# Patient Record
(demographics unavailable — no encounter records)

---

## 2017-01-26 NOTE — REP
Chest x-ray:  Two views:

 

History:  Hypertension.

 

Comparison chest x-ray: 10/08/2014.

 

Findings:  The lungs are hyperinflated but free of infiltrate.  Pleural angles

are sharp.  Heart is not enlarged.  The aorta is calcific and slightly tortuous.

There are degenerative changes in the thoracic spine.

 

Impression:

 

No active disease.

 

 

Signed by

Master Coffey MD 01/26/2017 04:53 P

## 2017-01-27 NOTE — ECGEPIP
Stationary ECG Study

                              [facility]

                                       

                                       Test Date:    2017

Pat Name:     LEWIS PALLADINO          Department:   

Patient ID:   [id number]                 Room:         -

Gender:       M                        Technician:   CHARLIE

:          1952               Requested By: Paul Kiser 

Order Number: [id number]     Reading MD:   Tripp Epps

                                 Measurements

Intervals                              Axis          

Rate:         68                       P:            65

AZ:           152                      QRS:          24

QRSD:         80                       T:            42

QT:           382                                    

QTc:          407                                    

                           Interpretive Statements

SINUS RHYTHM

ARTIFACT ON THE BASELINE IN THE LIMB LEADS

NO PRIOR TRACING

Electronically Signed On 2017 1:15:37 EST by Tripp Epps

## 2017-02-08 NOTE — RO
DATE OF PROCEDURE:  02/08/2017

 

PREOPERATIVE DIAGNOSES:  Left carpal tunnel syndrome.  Left ulnar nerve

entrapment at the wrist.  Left ulnar nerve entrapment at the elbow.

 

POSTOPERATIVE DIAGNOSES:  Left carpal tunnel syndrome.  Left ulnar nerve

entrapment at the wrist.  Left ulnar nerve entrapment at the elbow.

 

PROCEDURE:  Left carpal tunnel release.  Left ulnar nerve decompression at the

wrist.  Left ulnar nerve transposition at the elbow.

 

SURGEON:  Dr. Paul Kiser

 

FIRST ASSISTANT:  Luis Miguel Rivers.

 

ANESTHESIA:  General.

 

ESTIMATED BLOOD LOSS:  Less than 10.

 

COMPLICATIONS:  None.

 

INDICATIONS:  This is a 64-year-old gentleman with multiple medical problems who

has had ongoing numbness in his hand.  EMG nerve conduction study was consistent

with carpal tunnel syndrome and ulnar nerve entrapment at the wrist and at the

elbow.  He had failed conservative management and wished to go ahead with

surgical treatment.  He understood the nature of the procedure and the risks of

bleeding, infection, damage to nerves, vessels, persistent pain, persistent

numbness, need for further surgery, recurrence, among others.

 

PROCEDURE:  The patient was taken to the operating room and placed in supine

position after general anesthesia was induced.  The left upper extremity was

prepped and draped in the usual sterile fashion.  A time-out was performed.  I

then began at the elbow, making a curvilinear incision along the medial aspect of

the elbow and bluntly dissected down through subcutaneous tissue until I

identified the ulnar nerve without too much difficulty.  This was unroofed and

freed up throughout the cubital tunnel and then I dissected around the nerve.  I

controlled hemostasis with the bipolar cautery and gradually dissected the nerve

out of its groove.  I created a flap anteriorly on the fascia and I did divide a

little bit of the fascia just to take some of the pressure off of the nerve.  I

gradually transposed the nerve anteriorly.  I did take out some of the inner

muscular septum proximally and took care not to divide any of the muscular

branches.  The nerve was easily transposed anteriorly.  I put the elbow through a

range of motion and then protected the nerve by retracting it anteriorly with a

Rangeley.  I irrigated.  I tacked down subcutaneous tissue to the medial epicondyle

with two #2-0 Vicryl sutures and put the elbow through a range of motion.  The

ulnar nerve was under no tension throughout flexion and extension.  Subcutaneous

was closed with #2-0 Vicryl, skin with staples.  Then I directed attention

distally.

 

Incision was made along the proximal ulnar palm.  This was curved ulnarly and

then along the ulnar aspect of the wrist over the ulnar nerve.  Blunt dissection

was carried down through subcutaneous tissue until the transverse carpal ligament

was encountered.  This was incised longitudinally with a 15 blade entering the

ulnar border of the carpal tunnel, which I completed the dissection proximally

and distally under direct visualization, protecting the median nerve with a

Rangeley, and palpating to make sure that the nerve was freed up.  The ulnar nerve

was then identified through the more ulnar aspect of this incision and blunt

dissection was carried down through subcutaneous tissue until the ulnar nerve was

identified.  I identified the neurovascular bundle proximally and worked my way

distally freeing it up.  It seemed to be a little bit tight around the

midportion.  Once I was satisfied with the release of this nerve and the release

of the median nerve, I irrigated copiously and closed the skin with #5-0 nylon

suture.  A sterile dressing was applied.  Tourniquet was deflated.  I placed the

arm in a long-arm posterior splint.  Sling was applied.  He was taken to recovery

room in stable condition.  There were no known complications.  The plan be

routine postoperative.  Will take the splint off in about 12 days and take the

stitches and staples out and start range of motion.

## 2018-12-13 NOTE — RO
DATE OF PROCEDURE:  12/13/2018

 

PREOPERATIVE DIAGNOSIS:  Right colon mass.

 

POSTOPERATIVE DIAGNOSIS:  Right colon mass.

 

PROCEDURE:  Robotic right hemicolectomy.

 

SURGEON:  Dr. Stanley Stock

 

ASSISTANT:  Dr. Rivera, assisted with retraction of the colon as well as

reanastomosis.

 

ANESTHESIA:  General.

 

ESTIMATED BLOOD LOSS:  50 mL.

 

COMPLICATIONS:  None.

 

INDICATIONS FOR PROCEDURE:  The patient is a 66-year-old male who presented to 
the ER with unexplained

weight loss.  After colonoscopy, he was found to

have a nearly obstructing mass, came back positive for tubular adenoma with

high-grade dysplasia.  However, his CEA levels are high and this mass is

circumferential, so recommendation was to proceed with colon resection.  Risks

and benefits of the procedure not limited to but including bleeding, infection,

hernia formation, damage to surrounding structures, anastomotic leak and need 
for

further surgery were discussed in detail with the patient.  Informed consent was

obtained and procedure was planned.

 

DESCRIPTION OF PROCEDURE:  The patient was brought back to operating room seven

after sufficient sedation, the abdomen was sterilely prepped and draped and a

Figueroa catheter was placed.  Next, a time-out was done to confirm proper patient,

proper procedure.  Following that, an 8 mm supraumbilical midline incision was

made and the Veress needle was used to gain access to the abdomen.  Once the

abdomen was entered and was filled to 15 mmHg, the Veress needle was then 
removed

and a 12 mm Optiview port was used to gain access.  Two 8 mm robotic ports were

then placed, one subxiphoid, one in the lower mid abdomen, and another 15 mm

robotic port was placed in the left upper quadrant.  The colon was examined.  
The

mass was able to be palpated with graspers near the hepatic flexure and then was

able to move the small bowel out of the way and then the robot was docked to the

ports.  After docking to the ports, the colon was grasped with a third arm,

mobilized medially.  The lateral peritoneal connections to the colon were taken

down using the vessel sealer and scissors.  Once this was completed, the colon

was mobilized around the hepatic flexure to the proximal transverse colon and 
the

colon continued to be mobilized inferiorly and medially until it was completely

dissected free and free from the duodenum.  Once this was all completed, the

terminal ileum was identified, elevated up in the air and the mesentery of the

terminal ileum was opened up and green load of the stapler was used to transect.

The mesentery was then dissected through the terminal ileum and into the cecal

mesentery until the ileocolic artery was identified.  It was then continued to 
be

mobilized proximally, the vessel was then skeletonized and then was able to be

clamped and ligated using the vessel sealer.  Once the rest of the mesentery was

freed up and the entire right colon was free, the robot was undocked, the

supraumbilical incision was widened to about 5 cm, and the Phillip wound 
protector

was placed.  The two ends of the colon were brought out, a side-to-side

anastomosis was created between the terminal ileum and the proximal transverse

colon using a FILI 75 stapler and a blue load.  Once this was completed, I over

sewed the corners with #3-0 silk sutures, placed some Tisseel over the

anastomosis, tucked it back inside the abdomen.  The fascia at the 
supraumbilical

site was then closed with a running #1 Vicryl suture.  Subcutaneous tissues were

reapproximated with #3-0 Vicryl sutures and then staples.  The left upper

quadrant 15 mm port site, fascia was then closed with a figure-of-eight #0 
Vicryl

suture.  The rest of the skin incisions were closed with #4-0 Vicryl 
subcuticular

sutures.  The abdomen was cleaned and dried.  Steri-Strips, 4x4, and tape were

applied, thus ending procedure.

AISHA

## 2018-12-14 NOTE — IPNPDOC
Subjective


Date Seen


The patient was seen on 12/14/18.





Subjective


Chief Complaint/HPI


RAPID ASSESSMENT


General:  Reports: Fatigue


Constitutional:  Reports: Weakness


Pulmonary:  Denies: Dyspnea, Cough


Cardiovascular:  Reports: Lt Headedness; 


   Denies: Chest Pain, Palpitations


Gastrointestinal:  Denies: Nausea, Vomiting


Neurological:  Reports: Weakness





Objective


Physical Examination


General Exam:  Positive: Alert, Cooperative, Moderate Distress


Chest Exam:  Positive: Diminished; 


   Negative: Rales, Rhonchi, Wheezing


Heart Exam:  Positive: Rate Normal, Normal S1, Normal S2; 


   Negative: Gallops, Murmurs


Abdomen Exam:  Positive: Normal bowel sounds, Soft; 


   Negative: Tenderness


Extremity Exam:  Negative: Edema


Neuro Exam:  Positive: Normal Speech


Other physical findings


amanda red blood per rectum





Assessment /Plan


Assessment


Rapid assessment called overnight for patient- he was on bedside commode and 

slipped off, found by nursing staff on the floor of his room with amanda red 

blood per rectum on the floor and on his legs-RAT was initiated. The patient 

upon arrival was awake and laying on the floor but with profound generalized 

weakness, he denied chest pain nor difficulty breathing nor abdomen pain. Found 

to be hypotensive and bradycardic as well. Stated when he was using the bathroom

commode he became very weak and could not hold himself up and therefore slid off

onto the floor, unsure if he hit his head on the way down, did not complain of 

any extremity pain. Bedside BS was checked and found to be in 170's, upon 

physical exam he did have amanda red blood per his rectum on his legs and on the 

floor around him. He had to be lifted by 3 nurses to be put back into bed, with 

extreme generalized weakness. Stat CBC and BMP was ordered. STAT CT head 

pending. Given 1 L fluid bolus with improvement in BP and HR. Will transfer him 

to ICU for closer monitoring, Dr Gosselin of general surgery was made aware of 

situation by Dr Eckert, agreed with plan of care by medicine team. If Hg should be 

less than 8, we will transfuse. Otherwise will maintain his BP with IVF, monitor

closely and let day team know of patients events overnight.





Plan/VTE


VTE Prophylaxis Ordered?:  Yes





VS, I&O, 24H, Fishbone


Vital Signs/I&O





Vital Signs








  Date Time  Temp Pulse Resp B/P (MAP) Pulse Ox O2 Delivery O2 Flow Rate FiO2


 


12/14/18 22:00 98.7 57 18 113/57 (75) 94 Room Air  


 


12/13/18 22:00       2.0 














I&O- Last 24 Hours up to 6 AM 


 


 12/14/18





 06:00


 


Intake Total 1980 ml


 


Output Total 550 ml


 


Balance 1430 ml











Laboratory Data


24H LABS


Laboratory Tests 2


12/14/18 07:38: Bedside Glucose (Misc Panel) 218H


12/14/18 11:37: Bedside Glucose (Misc Panel) 119H


12/14/18 16:33: Bedside Glucose (Misc Panel) 108


12/14/18 20:20: Bedside Glucose (Misc Panel) 123H


12/14/18 22:44: Bedside Glucose (Misc Panel) 178H


12/14/18 22:57: 


CBC/BMP


Laboratory Tests


12/14/18 22:57








Red Blood Count 2.91 L, Mean Corpuscular Volume 95.5, Mean Corpuscular 

Hemoglobin 30.9, Mean Corpuscular Hemoglobin Concent 32.4, Red Cell Distribution

Width 15.1 H, Neutrophils (%) (Auto) 75.4 H, Lymphocytes (%) (Auto) 15.1 L, 

Monocytes (%) (Auto) 7.8 H, Eosinophils (%) (Auto) 0.7, Basophils (%) (Auto) 

0.3, Neutrophils # (Auto) 12.2 H, Lymphocytes # (Auto) 2.4, Monocytes # (Auto) 

1.3 H, Eosinophils # (Auto) 0.1, Basophils # (Auto) 0.1





GME ATTESTATION


GME ATTESTATION


My faculty preceptor for this patient encounter was physically present during 

the encounter and was fully available. All aspects of the patient interview, 

examination, medical decision making process, and medical care plan development 

were reviewed and approved by the faculty preceptor. The faculty preceptor is 

aware and concurs with the plan as stated in the body of this note and will 

attest to such by his/her cosignature.











SAVAGE ACOSTA DO               Dec 14, 2018 23:11

## 2018-12-15 NOTE — REP
Emergency noncontrast brain CT:

 

History:  Altered mental status.

 

Findings:  Preliminary digital  radiograph is unremarkable.  Bone window

settings demonstrate an intact bony calvarium.  There is mucosal thickening in

the apex of the left maxillary sinus, within the left ethmoid air cells, and in

the sphenoid and left frontal sinus consistent with left-sided paranasal

sinusitis.  No intraorbital abnormality is seen.  Vascular calcification is

noted.

 

There is mild diffuse atrophy.  Gray-white differentiation pattern is normal

above and below the tentorium.  There are small vessel atherosclerotic changes in

the periventricular white matter.  There is no evidence of hemorrhage.  No

infarct, mass or midline shift is seen.

 

Impression:

 

Mild diffuse atrophy and vascular calcification.  Small vessel changes.  No acute

intracranial lesion.

 

 

Electronically Signed by

Master Coffey MD 12/15/2018 10:07 A

## 2018-12-15 NOTE — IPN
DATE OF SERVICE:  12/15/2018

 

Patient was seen last night after he had his GI bleeding issue and he had stopped

and had been stopped for about an hour after he had been transferred to the ICU.

His hematocrit was not significantly diminished at that time and he had no

further GI bleeding.  He was not tachycardiac and overall was not complaining of

any significant abnormalities.  No shortness of breath.  He had what sounds as a

vagal response in the bathroom with a bloody bowel movement and now presents for

additional evaluation/treatment monitoring here in the ICU.  Otherwise his

abdomen is soft, mildly tender at the incision but otherwise not significantly

tender at this point. I stopped his Toradol last night given he had an elevated

creatinine and with some BUN elevation as well.  His creatinine is coming down a

little bit today.  He has been afebrile.  His input and output show that he has

had some minimal urine output and I anticipate he has been relatively dry.  Part

of this might of been the bleeding that he had.  But fortunately given his

improvement of his BUN and he has been voiding, I anticipate this is probably

turning around slowly with our resuscitation fluid.  Otherwise on his abdominal

exam it is otherwise benign.  No evidence of erythema, no drainage.  No evidence

of infection.

 

IMPRESSION AND PLAN:

1.  GI bleeding seems to have stabilized.  No bleeding since that time last

night.  It is probably related to Plavix that was restarted on him and since will

discontinue that Plavix and unfortunately will take the higher risk of clotting

associated with not having Plavix on board, but with his recent bleed and the

potential need for reoperative intervention should he ever bleed again, I do feel

that that is a risk that we can take at this point.  From the standpoint of

overall he feels well, he is not having any complaints, he is hungry, I do feel

that we can advance his diet.  I would like to keep his IV fluids going at this

point and recheck his blood work this afternoon to make sure he does not have

ongoing bleeding issues.  If he has a decreasing hematocrit this afternoon, it

may warrant a couple units of blood helping his dehydration issue as well as

helping his bleeding (from the anastomosis, probable).

## 2018-12-16 NOTE — IPN
DATE:  12/16/2018

 

The patient overall states that he is doing relatively well.  Still had some

bloody bowel movements overnight and his hematocrit is down again this morning.

He do feel that he has some symptomatic orthostasis with this decreased

hematocrit, although it sounds as though it slowed down if not stopped.  I do

feel that with this being symptomatic he would benefit for some transfusions at

this point.  We have had a discussion about transfusions with the patient.  He

understands and would like to proceed with this.  He has been afebrile.  His

white count is back down to normal at this point.  His urine output has been

adequate from his description; however when ever he has a bowel movement, he has

some diarrhea.  He also urinates at the same time and is not "keeping this."  His

BUN has been relatively stable.  His creatinine continues to come down each day.

He has been tolerating a regular diet and has no nausea with this, has had no

other complaints from a gastrointestinal (GI) standpoint and his pain control has

been relatively good at this point with minimal by mouth pain meds.

 

On his physical exam, incisions healing nicely without any erythema, drainage or

discharge.  He has lungs which are clear to auscultation without crackles,

wheezes or rhonchi.

 

IMPRESSION AND PLAN:  The patient had a right colectomy.  He had some

postoperative bleeding most likely secondary to anticoagulation; and at this

point, I do feel that we need to keep him off this anticoagulation for now and I

would like to see how he is doing over the next 24 hours given that he does have

some orthostatic hypotension.  Orthostatic symptoms, specifically becoming dizzy

when he stands up, etc., I do feel that it is reasonable to give him a couple

units of blood.  We may need to diurese him later on today depending on how his

urine output is, but he seems to be making some good progress at this time.  It

is also reasonable to get him to a lower level of care, does not need the

intensive care unit (ICU) at this point.

## 2019-01-10 NOTE — DSES
DATE OF ADMISSION:  12/13/2018

DATE OF DISCHARGE:  12/18/2018

 

ADMISSION DIAGNOSIS:  Right colon cancer.

 

DISCHARGE DIAGNOSIS:  Right colon cancer.

 

HOSPITAL COURSE:  The patient is a 66-year-old male who presented for an elective

robotic right hemicolectomy.  Surgery was uneventful.  Postoperatively, he did

have some problems with some bloody bowel movements and weakness.  He was

transferred to the progressive care unit (PCU) to be monitored and was transfused

a couple units of blood.  Following that, his symptoms all stabilized and he

recovered well from there.  No problems with fevers during the stay.  He had

return of bowel function within the first 48 hours.  No problems with any nausea

or vomiting.  Tolerating a diet.  On discharge, his pain was controlled.

Incisions were clean, dry and intact.  He was tolerating diet.  No nausea or

vomiting.  He was up ambulating in the halls.  He was safe and comfortable being

discharged home.  He was discharged home to follow up me in 2 weeks.  No lifting,

pushing, or pulling more than20 pounds for 2 weeks.  No baths for 5 days and okay

to shower.  All of his questions were answered.  He was discharged home with pain

medication and will call the office with any questions.

## 2019-09-13 NOTE — ROOR
________________________________________________________________________________

Patient Name: Lewis Palladino          Procedure Date: 9/13/2019 9:06 AM

MRN: [id number]                          Account Number: [id number]

YOB: 1952               Age: 67

Room: [facility]                            Gender: Male

Note Status: Finalized                 

________________________________________________________________________________

 

Procedure:           Colonoscopy

Indications:         Weight loss

Providers:           Aníbal Reyes MD

Referring MD:        ERWIN JAMES DO

Requesting Provider: 

Medicines:           Monitored Anesthesia Care

Complications:       No immediate complications.

________________________________________________________________________________

Procedure:           Pre-Anesthesia Assessment:

                     - Prior to the procedure, a History and Physical was 

                     performed, and patient medications and allergies were 

                     reviewed. The patient is competent. The risks and 

                     benefits of the procedure and the sedation options and 

                     risks were discussed with the patient. All questions were 

                     answered and informed consent was obtained. Patient 

                     identification and proposed procedure were verified by 

                     the physician, the nurse and the anesthesiologist in the 

                     procedure room. Mental Status Examination: alert and 

                     oriented. Airway Examination: normal oropharyngeal airway 

                     and neck mobility. Respiratory Examination: clear to 

                     auscultation. CV Examination: normal. Prophylactic 

                     Antibiotics: The patient does not require prophylactic 

                     antibiotics. Prior Anticoagulants: The patient has taken 

                     Plavix (clopidogrel), last dose was 7 days prior to 

                     procedure. ASA Grade Assessment: III - A patient with 

                     severe systemic disease. After reviewing the risks and 

                     benefits, the patient was deemed in satisfactory 

                     condition to undergo the procedure. The anesthesia plan 

                     was to use monitored anesthesia care (MAC). Immediately 

                     prior to administration of medications, the patient was 

                     re-assessed for adequacy to receive sedatives. The heart 

                     rate, respiratory rate, oxygen saturations, blood 

                     pressure, adequacy of pulmonary ventilation, and response 

                     to care were monitored throughout the procedure. The 

                     physical status of the patient was re-assessed after the 

                     procedure.

                     The Colonoscope was introduced through the anus and 

                     advanced to the ileocolonic anastomosis. The colonoscopy 

                     was performed without difficulty. The patient tolerated 

                     the procedure well. The quality of the bowel preparation 

                     was good. The terminal ileum, the appendiceal orifice and 

                     the rectum were photographed. Scope insertion time was 3 

                     minutes. Scope withdrawal time was 10 minutes. The total 

                     duration of the procedure was 14 minutes.

                                                                                

Findings:

     The perianal and digital rectal examinations were normal.

     The wali-terminal ileum appeared normal.

     There was evidence of a prior functional end-to-end ileo-colonic 

     anastomosis in the ascending colon. This was patent and was characterized 

     by healthy appearing mucosa.

     A localized area of moderately erythematous mucosa was found at the 

     anastomosis. This was biopsied with a cold forceps for histology. 

     Verification of patient identification for the specimen was done by the 

     physician and nurse using the patient's name, birth date and medical 

     record number. Estimated blood loss was minimal.

     Three sessile polyps were found in the recto-sigmoid colon. The polyps 

     were 5 to 10 mm in size. These polyps were removed with a cold snare. 

     Resection and retrieval were complete. Verification of patient 

     identification for the specimen was done by the physician and nurse using 

     the patient's name, birth date and medical record number.

     Non-bleeding external and internal hemorrhoids were found during 

     retroflexion. The hemorrhoids were medium-sized.

                                                                                

Impression:          - The examined portion of the ileum was normal.

                     - Patent functional end-to-end ileo-colonic anastomosis, 

                     characterized by healthy appearing mucosa.

                     - Erythematous mucosa at the colonic anastomosis. 

                     Biopsied.

                     - Three 5 to 10 mm polyps at the recto-sigmoid colon, 

                     removed with a cold snare. Resected and retrieved.

                     - Non-bleeding external and internal hemorrhoids.

Recommendation:      - Patient has a contact number available for emergencies. 

                     The signs and symptoms of potential delayed complications 

                     were discussed with the patient. Return to normal 

                     activities tomorrow. Written discharge instructions were 

                     provided to the patient.

                     - Resume Plavix (clopidogrel) at prior dose tomorrow. 

                     Refer to primary physician for further adjustment of 

                     therapy.

                     - High fiber diet.

                     - Continue present medications.

                     - Await pathology results.

                     - Repeat colonoscopy in 3 years for surveillance based on 

                     pathology results.

                     - Telephone GI clinic for pathology results in 2 weeks.

                     - Return to primary care physician.

                                                                                

 

Aníbal Reyes MD

_______________________

Aníbal Reyes MD

9/13/2019 10:14:28 AM

Electronically signed by Aníbal Reyes MD

Number of Addenda: 0

 

Note Initiated On: 9/13/2019 9:06 AM

Estimated Blood Loss:

     Estimated blood loss was minimal.

## 2019-09-13 NOTE — ROOR
________________________________________________________________________________

Patient Name: Lewis Palladino          Procedure Date: 9/13/2019 9:05 AM

MRN: [id number]                          Account Number: [id number]

YOB: 1952               Age: 67

Room: [facility]                            Gender: Male

Note Status: Finalized                 

________________________________________________________________________________

 

Procedure:           Upper GI endoscopy

Indications:         Weight loss

Providers:           Aníbal Reyes MD

Referring MD:        ERWIN JAMES DO

Requesting Provider: 

Medicines:           Monitored Anesthesia Care

Complications:       No immediate complications.

________________________________________________________________________________

Procedure:           Pre-Anesthesia Assessment:

                     - Prior to the procedure, a History and Physical was 

                     performed, and patient medications and allergies were 

                     reviewed. The patient is competent. The risks and 

                     benefits of the procedure and the sedation options and 

                     risks were discussed with the patient. All questions were 

                     answered and informed consent was obtained. Patient 

                     identification and proposed procedure were verified by 

                     the physician, the nurse and the anesthesiologist in the 

                     procedure room. Mental Status Examination: normal. Airway 

                     Examination: normal oropharyngeal airway and neck 

                     mobility. Respiratory Examination: clear to auscultation. 

                     CV Examination: normal. Prophylactic Antibiotics: The 

                     patient does not require prophylactic antibiotics. Prior 

                     Anticoagulants: The patient has taken no previous 

                     anticoagulant or antiplatelet agents. ASA Grade 

                     Assessment: III - A patient with severe systemic disease. 

                     After reviewing the risks and benefits, the patient was 

                     deemed in satisfactory condition to undergo the 

                     procedure. The anesthesia plan was to use monitored 

                     anesthesia care (MAC). Immediately prior to 

                     administration of medications, the patient was 

                     re-assessed for adequacy to receive sedatives. The heart 

                     rate, respiratory rate, oxygen saturations, blood 

                     pressure, adequacy of pulmonary ventilation, and response 

                     to care were monitored throughout the procedure. The 

                     physical status of the patient was re-assessed after the 

                     procedure.

                     The Endoscope was introduced through the mouth, and 

                     advanced to the second part of duodenum. The upper GI 

                     endoscopy was accomplished without difficulty. The 

                     patient tolerated the procedure well.

                                                                                

Findings:

     A moderate-sized area of extrinsic compression was found in the upper 

     third of the esophagus.

     The Z-line was regular and was found in the distal esophagus.

     Diffuse moderate inflammation characterized by congestion (edema), 

     erythema, friability and granularity was found in the cardia, in the 

     gastric fundus and in the gastric body. Two biopsies were obtained with 

     cold forceps for histology in the gastric fundus, as well as four 

     biopsies in the gastric body. Biopsies were taken with a cold forceps for 

     Helicobacter pylori testing. Verification of patient identification for 

     the specimen was done by the physician and nurse using the patient's 

     name, birth date and medical record number. Estimated blood loss was 

     minimal.

     The duodenal bulb and second portion of the duodenum were normal. 

     Biopsies for histology were taken with a cold forceps for evaluation of 

     celiac disease.

                                                                                

Impression:          - Extrinsic compression in the upper third of the 

                     esophagus.

                     - Z-line regular, in the distal esophagus.

                     - Gastritis. Biopsied.

                     - Normal duodenal bulb and second portion of the 

                     duodenum. Biopsied.

                     - Biopsies performed in the gastric fundus and in the 

                     gastric body.

Recommendation:      - Patient has a contact number available for emergencies. 

                     The signs and symptoms of potential delayed complications 

                     were discussed with the patient. Return to normal 

                     activities tomorrow. Written discharge instructions were 

                     provided to the patient.

                     - Resume previous diet.

                     - Continue present medications.

                     - Await pathology results.

                     - Perform a barium swallow using barium in liquid and 

                     tablet form at appointment to be scheduled.

                     - Perform an esophagram at appointment to be scheduled.

                     - Telephone GI clinic for pathology results in 2 weeks.

                     - Return to primary care physician.

                                                                                

 

Aníbal Reyes MD

_______________________

Aníbal Reyes MD

9/13/2019 10:52:17 AM

Electronically signed by Aníbal Reyes MD

Number of Addenda: 0

 

Note Initiated On: 9/13/2019 9:05 AM

Estimated Blood Loss:

     Estimated blood loss was minimal.